# Patient Record
Sex: MALE | Race: OTHER | Employment: UNEMPLOYED | URBAN - METROPOLITAN AREA
[De-identification: names, ages, dates, MRNs, and addresses within clinical notes are randomized per-mention and may not be internally consistent; named-entity substitution may affect disease eponyms.]

---

## 2017-06-10 ENCOUNTER — HOSPITAL ENCOUNTER (EMERGENCY)
Age: 47
Discharge: HOME | End: 2017-06-10
Attending: EMERGENCY MEDICINE
Payer: SELF-PAY

## 2017-06-10 ENCOUNTER — APPOINTMENT (OUTPATIENT)
Dept: CT IMAGING | Age: 47
End: 2017-06-10
Attending: EMERGENCY MEDICINE
Payer: SELF-PAY

## 2017-06-10 VITALS
SYSTOLIC BLOOD PRESSURE: 122 MMHG | WEIGHT: 171 LBS | RESPIRATION RATE: 15 BRPM | OXYGEN SATURATION: 100 % | BODY MASS INDEX: 30.3 KG/M2 | HEIGHT: 63 IN | HEART RATE: 65 BPM | TEMPERATURE: 97.8 F | DIASTOLIC BLOOD PRESSURE: 84 MMHG

## 2017-06-10 DIAGNOSIS — M54.50 ACUTE BILATERAL LOW BACK PAIN WITHOUT SCIATICA: Primary | ICD-10-CM

## 2017-06-10 LAB
ALBUMIN SERPL BCP-MCNC: 4 G/DL (ref 3.5–5)
ALBUMIN/GLOB SERPL: 1 {RATIO} (ref 1.1–2.2)
ALP SERPL-CCNC: 90 U/L (ref 45–117)
ALT SERPL-CCNC: 28 U/L (ref 12–78)
ANION GAP BLD CALC-SCNC: 7 MMOL/L (ref 5–15)
APPEARANCE UR: CLEAR
AST SERPL W P-5'-P-CCNC: 19 U/L (ref 15–37)
BACTERIA URNS QL MICRO: NEGATIVE /HPF
BASOPHILS # BLD AUTO: 0.1 K/UL (ref 0–0.1)
BASOPHILS # BLD: 1 % (ref 0–1)
BILIRUB SERPL-MCNC: 0.3 MG/DL (ref 0.2–1)
BILIRUB UR QL: NEGATIVE
BUN SERPL-MCNC: 7 MG/DL (ref 6–20)
BUN/CREAT SERPL: 7 (ref 12–20)
CALCIUM SERPL-MCNC: 8.6 MG/DL (ref 8.5–10.1)
CHLORIDE SERPL-SCNC: 105 MMOL/L (ref 97–108)
CO2 SERPL-SCNC: 27 MMOL/L (ref 21–32)
COLOR UR: ABNORMAL
CREAT SERPL-MCNC: 0.94 MG/DL (ref 0.7–1.3)
EOSINOPHIL # BLD: 0.6 K/UL (ref 0–0.4)
EOSINOPHIL NFR BLD: 10 % (ref 0–7)
EPITH CASTS URNS QL MICRO: ABNORMAL /LPF
ERYTHROCYTE [DISTWIDTH] IN BLOOD BY AUTOMATED COUNT: 13.5 % (ref 11.5–14.5)
GLOBULIN SER CALC-MCNC: 3.9 G/DL (ref 2–4)
GLUCOSE SERPL-MCNC: 103 MG/DL (ref 65–100)
GLUCOSE UR STRIP.AUTO-MCNC: NEGATIVE MG/DL
HCT VFR BLD AUTO: 45 % (ref 36.6–50.3)
HGB BLD-MCNC: 15.5 G/DL (ref 12.1–17)
HGB UR QL STRIP: ABNORMAL
HYALINE CASTS URNS QL MICRO: ABNORMAL /LPF (ref 0–5)
KETONES UR QL STRIP.AUTO: NEGATIVE MG/DL
LEUKOCYTE ESTERASE UR QL STRIP.AUTO: NEGATIVE
LIPASE SERPL-CCNC: 155 U/L (ref 73–393)
LYMPHOCYTES # BLD AUTO: 39 % (ref 12–49)
LYMPHOCYTES # BLD: 2.3 K/UL (ref 0.8–3.5)
MCH RBC QN AUTO: 30.5 PG (ref 26–34)
MCHC RBC AUTO-ENTMCNC: 34.4 G/DL (ref 30–36.5)
MCV RBC AUTO: 88.4 FL (ref 80–99)
MONOCYTES # BLD: 0.5 K/UL (ref 0–1)
MONOCYTES NFR BLD AUTO: 9 % (ref 5–13)
NEUTS SEG # BLD: 2.4 K/UL (ref 1.8–8)
NEUTS SEG NFR BLD AUTO: 41 % (ref 32–75)
NITRITE UR QL STRIP.AUTO: NEGATIVE
PH UR STRIP: 6 [PH] (ref 5–8)
PLATELET # BLD AUTO: 255 K/UL (ref 150–400)
POTASSIUM SERPL-SCNC: 4.3 MMOL/L (ref 3.5–5.1)
PROT SERPL-MCNC: 7.9 G/DL (ref 6.4–8.2)
PROT UR STRIP-MCNC: NEGATIVE MG/DL
RBC # BLD AUTO: 5.09 M/UL (ref 4.1–5.7)
RBC #/AREA URNS HPF: ABNORMAL /HPF (ref 0–5)
SODIUM SERPL-SCNC: 139 MMOL/L (ref 136–145)
SP GR UR REFRACTOMETRY: <1.005 (ref 1–1.03)
UA: UC IF INDICATED,UAUC: ABNORMAL
UROBILINOGEN UR QL STRIP.AUTO: 0.2 EU/DL (ref 0.2–1)
WBC # BLD AUTO: 5.9 K/UL (ref 4.1–11.1)
WBC URNS QL MICRO: ABNORMAL /HPF (ref 0–4)

## 2017-06-10 PROCEDURE — 74177 CT ABD & PELVIS W/CONTRAST: CPT

## 2017-06-10 PROCEDURE — 96375 TX/PRO/DX INJ NEW DRUG ADDON: CPT

## 2017-06-10 PROCEDURE — 81001 URINALYSIS AUTO W/SCOPE: CPT | Performed by: EMERGENCY MEDICINE

## 2017-06-10 PROCEDURE — 83690 ASSAY OF LIPASE: CPT | Performed by: EMERGENCY MEDICINE

## 2017-06-10 PROCEDURE — 74011250636 HC RX REV CODE- 250/636: Performed by: EMERGENCY MEDICINE

## 2017-06-10 PROCEDURE — 96374 THER/PROPH/DIAG INJ IV PUSH: CPT

## 2017-06-10 PROCEDURE — 74011636320 HC RX REV CODE- 636/320: Performed by: EMERGENCY MEDICINE

## 2017-06-10 PROCEDURE — 99284 EMERGENCY DEPT VISIT MOD MDM: CPT

## 2017-06-10 PROCEDURE — 96361 HYDRATE IV INFUSION ADD-ON: CPT

## 2017-06-10 PROCEDURE — 80053 COMPREHEN METABOLIC PANEL: CPT | Performed by: EMERGENCY MEDICINE

## 2017-06-10 PROCEDURE — 85025 COMPLETE CBC W/AUTO DIFF WBC: CPT | Performed by: EMERGENCY MEDICINE

## 2017-06-10 PROCEDURE — 36415 COLL VENOUS BLD VENIPUNCTURE: CPT | Performed by: EMERGENCY MEDICINE

## 2017-06-10 RX ORDER — IBUPROFEN 600 MG/1
600 TABLET ORAL
Qty: 30 TAB | Refills: 0 | Status: SHIPPED | OUTPATIENT
Start: 2017-06-10

## 2017-06-10 RX ORDER — KETOROLAC TROMETHAMINE 30 MG/ML
15 INJECTION, SOLUTION INTRAMUSCULAR; INTRAVENOUS
Status: COMPLETED | OUTPATIENT
Start: 2017-06-10 | End: 2017-06-10

## 2017-06-10 RX ORDER — ONDANSETRON 2 MG/ML
4 INJECTION INTRAMUSCULAR; INTRAVENOUS
Status: COMPLETED | OUTPATIENT
Start: 2017-06-10 | End: 2017-06-10

## 2017-06-10 RX ORDER — FENTANYL CITRATE 50 UG/ML
50 INJECTION, SOLUTION INTRAMUSCULAR; INTRAVENOUS
Status: COMPLETED | OUTPATIENT
Start: 2017-06-10 | End: 2017-06-10

## 2017-06-10 RX ADMIN — ONDANSETRON 4 MG: 2 INJECTION INTRAMUSCULAR; INTRAVENOUS at 11:35

## 2017-06-10 RX ADMIN — FENTANYL CITRATE 50 MCG: 50 INJECTION, SOLUTION INTRAMUSCULAR; INTRAVENOUS at 11:35

## 2017-06-10 RX ADMIN — SODIUM CHLORIDE 1000 ML: 900 INJECTION, SOLUTION INTRAVENOUS at 11:30

## 2017-06-10 RX ADMIN — KETOROLAC TROMETHAMINE 15 MG: 30 INJECTION, SOLUTION INTRAMUSCULAR at 11:31

## 2017-06-10 RX ADMIN — IOPAMIDOL 100 ML: 755 INJECTION, SOLUTION INTRAVENOUS at 12:38

## 2017-06-10 NOTE — DISCHARGE INSTRUCTIONS
Dolor de espalda: Instrucciones de cuidado - [ Back Pain: Care Instructions ]  Instrucciones de cuidado    El dolor de espalda tiene muchas causas posibles. Con frecuencia, está relacionado con problemas en los músculos y ligamentos de la espalda. También podría estar relacionado con problemas de los nervios, discos o huesos de la espalda. Moverse, levantar algo, ponerse de pie, sentarse o dormir de [employer] incómoda pueden forzar la espalda. Algunas veces no se da cuenta de que tiene iveth lesión [employer] tarde. La artritis es otra causa común del dolor de espalda. Aunque gina vez duela mucho, el dolor de espalda por lo general mejora por sí mismo en varias semanas. [address] personas se recuperan en 12 semanas o menos. Hacer un buen tratamiento en el hogar y tener cuidado de no forzar la espalda puede ayudar a que se sienta mejor más pronto. La atención de seguimiento es iveth parte clave de dhaliwal tratamiento y seguridad. Asegúrese de hacer y acudir a todas las citas, y llame a dhaliwal médico si está teniendo problemas. También es iveth buena idea saber los resultados de los exámenes y mantener vieth lista de los medicamentos que barb. ¿Cómo puede cuidarse en el hogar? · Siéntese o acuéstese en las posiciones más cómodas y que reduzcan el dolor. Trate iveth de estas posiciones al acostarse:  ¨ Acuéstese boca arriba con las rodillas dobladas y apoyadas sobre almohadones grandes. ¨ Acuéstese en el piso con las piernas sobre el asiento de un sofá o iveth silla. ¨ Acuéstese de lado con las rodillas y caderas dobladas y [place] entre las piernas. ¨ Acuéstese boca abajo siempre que esta posición no empeore el dolor. · No se siente en la cama y evite los sofás blandos y las posiciones torcidas. El reposo en cama puede aliviar el dolor al principio, simon retrasa la sanación. Evite reposar en la cama después del primer día de dolor de espalda. · Cambie de posición cada 30 minutos.  Si debe sentarse por [employer], párese y descanse de estar sentado. Levántese y camine, o acuéstese en iveth posición cómoda. · Pruebe usar iveth almohadilla térmica a temperatura baja o mediana juany 15 a 20 minutos cada 2 ó 3 horas. Pruebe iveth ducha tibia en vez de iveth sesión con la almohadilla térmica. · También puede probar iveth compresa de hielo juany 10 a 15 minutos cada 2 a 3 horas. Póngase un paño medeiros entre la compresa de hielo y la piel. · [employer] analgésicos (medicamentos para el dolor) exactamente según las indicaciones. ¨ Si el médico le recetó un analgésico, tómelo según las indicaciones. ¨ Si no está tomando un analgésico recetado, pregúntele a dhaliwal médico si puede becki kiel de [employer]. · Umberto caminatas cortas varias veces al día. Usted puede comenzar con 5 a 10 minutos, 3 ó 4 veces al día, y aumentar progresivamente hasta lograr caminatas más largas. Camine en superficies amie y evite colinas y escaleras hasta que la espalda esté mejor. · Regrese al Jillian Stair y otras actividades lo más pronto posible. El descanso continuo sin actividad por lo general no es lau para la espalda. · Para prevenir el dolor de espalda en el futuro, umberto ejercicios para estirar y fortalecer la espalda y el abdomen. Aprenda a [employer], técnicas seguras para levantar peso y la mecánica corporal apropiada. ¿Cuándo debe pedir ayuda? Llame a dhaliwal médico ahora mismo o busque atención médica inmediata si:  · Tiene un nuevo entumecimiento en las piernas o éste empeora. · Tiene un nuevo debilitamiento en [place]. ([place] puede hacer que sea difícil ponerse de pie). · Pierde el control de la vejiga o los intestinos. Preste especial atención a los cambios en dhaliwal gio y asegúrese de comunicarse con dhaliwal médico si:  · El migdalia PEREZ. · No está mejorando después de 2 semanas. ¿Dónde puede encontrar más información en inglés? Britt Deter a [email or web address].[email or web address]   Escriba R037 en la búsqueda para aprender Fatoumata Sexton de \"Dolor de espalda: Instrucciones de cuidado - [ Back Pain: Care Instructions ]. \"  Revisado: 23 mayo, 2016  Versión del contenido: 11.2  © 7153-7898 Healthwise, Incorporated. Las instrucciones de cuidado fueron adaptadas bajo licencia por [employer] (which disclaims liability or warranty for this information). Si usted tiene [place] afección médica o sobre estas instrucciones, siempre pregunte a dhaliwal profesional de gio. Healthwise, Incorporated niega toda garantía o responsabilidad por dhaliwal uso de esta información. Vías urinarias masculinas: Dibujo anatómico - [ Male Urinary Tract: Anatomy Sketch ]    Revisado: 5 enero, 2017  Versión del contenido: 11.2  © 8210-1264 Healthwise, Incorporated. Las instrucciones de cuidado fueron adaptadas bajo licencia por [employer] (which disclaims liability or warranty for this information). Si usted tiene [place] afección médica o sobre estas instrucciones, siempre pregunte a dhaliwal profesional de gio. Healthwise, Incorporated niega toda garantía o responsabilidad por dhaliwal uso de esta información. We hope that we have addressed all of your medical concerns. The examination and treatment you received in the Emergency Department were for an emergent problem and were not intended as complete care. It is important that you follow up with your healthcare provider(s) for ongoing care. If your symptoms worsen or do not improve as expected, and you are unable to reach your usual health care provider(s), you should return to the Emergency Department. Today's healthcare is undergoing tremendous change, and patient satisfaction surveys are one of the many tools to assess the quality of medical care. You may receive a survey from the [employer] organization regarding your experience in the Emergency Department.   I hope that your experience has been completely positive, particularly the medical care that I provided. As such, please participate in the survey; anything less than excellent does not meet my expectations or intentions. [facility] and [facility] participate in nationally recognized quality of care measures. If your blood pressure is greater than 120/80, as reported below, we urge that you seek medical care to address the potential of high blood pressure, commonly known as hypertension. Hypertension can be hereditary or can be caused by certain medical conditions, pain, stress, or \"white coat syndrome. \"       Please make an appointment with your health care provider(s) for follow up of your Emergency Department visit. VITALS:   Patient Vitals for the past 8 hrs:   Temp Pulse Resp BP SpO2   06/10/17 1215 - - - 111/77 98 %   06/10/17 1200 - - - 115/54 97 %   06/10/17 1145 - - - 114/80 94 %   06/10/17 1049 97.8 °F (36.6 °C) 81 15 132/82 98 %          Thank you for allowing us to provide you with medical care today. We realize that you have many choices for your emergency care needs. Please choose us in the future for any continued health care needs. [address], [address] [facility].   Office: [phone]            Recent Results (from the past 24 hour(s))   URINALYSIS W/ REFLEX CULTURE    Collection Time: 06/10/17 11:01 AM   Result Value Ref Range    Color YELLOW/STRAW      Appearance CLEAR CLEAR      Specific gravity <1.005 1.003 - 1.030    pH (UA) 6.0 5.0 - 8.0      Protein NEGATIVE  NEG mg/dL    Glucose NEGATIVE  NEG mg/dL    Ketone NEGATIVE  NEG mg/dL    Bilirubin NEGATIVE  NEG      Blood TRACE (A) NEG      Urobilinogen 0.2 0.2 - 1.0 EU/dL    Nitrites NEGATIVE  NEG      Leukocyte Esterase NEGATIVE  NEG      WBC 0-4 0 - 4 /hpf    RBC 0-5 0 - 5 /hpf    Epithelial cells FEW FEW /lpf    Bacteria NEGATIVE  NEG /hpf    UA:UC IF INDICATED CULTURE NOT INDICATED BY UA RESULT CNI      Hyaline cast 0-2 0 - 5 /lpf   LIPASE    Collection Time: 06/10/17 11:01 AM   Result Value Ref Range    Lipase 155 73 - 806 U/L   METABOLIC PANEL, COMPREHENSIVE    Collection Time: 06/10/17 11:01 AM   Result Value Ref Range    Sodium 139 136 - 145 mmol/L    Potassium 4.3 3.5 - 5.1 mmol/L    Chloride 105 97 - 108 mmol/L    CO2 27 21 - 32 mmol/L    Anion gap 7 5 - 15 mmol/L    Glucose 103 (H) 65 - 100 mg/dL    BUN 7 6 - 20 MG/DL    Creatinine 0.94 0.70 - 1.30 MG/DL    BUN/Creatinine ratio 7 (L) 12 - 20      GFR est AA >60 >60 ml/min/1.73m2    GFR est non-AA >60 >60 ml/min/1.73m2    Calcium 8.6 8.5 - 10.1 MG/DL    Bilirubin, total 0.3 0.2 - 1.0 MG/DL    ALT (SGPT) 28 12 - 78 U/L    AST (SGOT) 19 15 - 37 U/L    Alk. phosphatase 90 45 - 117 U/L    Protein, total 7.9 6.4 - 8.2 g/dL    Albumin 4.0 3.5 - 5.0 g/dL    Globulin 3.9 2.0 - 4.0 g/dL    A-G Ratio 1.0 (L) 1.1 - 2.2     CBC WITH AUTOMATED DIFF    Collection Time: 06/10/17 11:01 AM   Result Value Ref Range    WBC 5.9 4.1 - 11.1 K/uL    RBC 5.09 4. 10 - 5.70 M/uL    HGB 15.5 12.1 - 17.0 g/dL    HCT 45.0 36.6 - 50.3 %    MCV 88.4 80.0 - 99.0 FL    MCH 30.5 26.0 - 34.0 PG    MCHC 34.4 30.0 - 36.5 g/dL    RDW 13.5 11.5 - 14.5 %    PLATELET 552 060 - 821 K/uL    NEUTROPHILS 41 32 - 75 %    LYMPHOCYTES 39 12 - 49 %    MONOCYTES 9 5 - 13 %    EOSINOPHILS 10 (H) 0 - 7 %    BASOPHILS 1 0 - 1 %    ABS. NEUTROPHILS 2.4 1.8 - 8.0 K/UL    ABS. LYMPHOCYTES 2.3 0.8 - 3.5 K/UL    ABS. MONOCYTES 0.5 0.0 - 1.0 K/UL    ABS. EOSINOPHILS 0.6 (H) 0.0 - 0.4 K/UL    ABS. BASOPHILS 0.1 0.0 - 0.1 K/UL       Ct Abd Pelv W Cont    Result Date: 6/10/2017  EXAM: CT ABDOMEN PELVIS WITH CONTRAST INDICATION:  Bilateral flank pain, UTI. COMPARISON: None. CONTRAST: 100 mL of Isovue-370. TECHNIQUE: Multislice helical CT was performed from the diaphragm to the symphysis pubis during uneventful rapid bolus intravenous contrast administration. Oral contrast was not administered.  Contiguous 5 mm axial images were reconstructed and lung and soft tissue windows were generated. Coronal and sagittal reformations were generated. CT dose reduction was achieved through use of a standardized protocol tailored for this examination and automatic exposure control for dose modulation. FINDINGS: LOWER CHEST: The visualized portions of the lung bases are clear. ABDOMEN: Liver: Small low-attenuation lesion on portal venous phase images which is isodense on delayed images, likely representing a hemangioma. No suspicious liver lesions. Gallbladder and bile ducts: No gallstones. No biliary dilatation. Spleen: No abnormality. Pancreas: No abnormality. Adrenal glands: No abnormality. Kidneys: No abnormality. No calculi or hydronephrosis. No focal lesions. PELVIS: Reproductive organs:  Mild prostatic enlargement. Bladder: No abnormality. BOWEL AND MESENTERY: Stomach appears unremarkable. No small bowel abnormalities. No significant colonic abnormalities. No mesenteric mass or adenopathy. Appendix within normal limits. PERITONEUM: No ascites or free intraperitoneal air. RETROPERITONEUM: Aorta  tapers without aneurysm. No retroperitoneal adenopathy or mass. No pelvic mass or adenopathy. BONES AND SOFT TISSUES: No significant bony abnormalities. IMPRESSION: No acute findings in the abdomen or pelvis. No significant abnormalities. Jerome Gia

## 2017-06-10 NOTE — ED TRIAGE NOTES
Bilateral lower back pain since Monday, no injury. Hx of similar pain last year with a kidney infection.

## 2017-06-10 NOTE — ED PROVIDER NOTES
HPI Comments: 51 y.o. male with no significant past medical history who presents from home with chief complaint of flank pain. Pt reports equal bilateral 8/10 flank pain for the last 5 days which is worsened positionally. He also c/o fever 2-3 days ago, but none currently. Per Pt's daughter, Pt was seen at [facility] 2 days ago, was dx with kidney stones, and was placed on hydrocodone, flomax, and bactrim. She notes the medications have not been helping. Pt denies testicular pain, dark urine, nausea, and vomiting. There are no other acute medical concerns at this time. Note written by Ania Santos, as dictated by Malachi Johnson MD 11:08 AM      The history is provided by the patient. No past medical history on file. No past surgical history on file. No family history on file. Social History     Social History    Marital status: N/A     Spouse name: N/A    Number of children: N/A    Years of education: N/A     Occupational History    Not on file. Social History Main Topics    Smoking status: Not on file    Smokeless tobacco: Not on file    Alcohol use Not on file    Drug use: Not on file    Sexual activity: Not on file     Other Topics Concern    Not on file     Social History Narrative         ALLERGIES: Review of patient's allergies indicates no known allergies. Review of Systems   Constitutional: Positive for fever. Gastrointestinal: Negative for nausea and vomiting. Genitourinary: Positive for flank pain. Negative for testicular pain. All other systems reviewed and are negative. Vitals:    06/10/17 1049   BP: 132/82   Pulse: 81   Resp: 15   Temp: 97.8 °F (36.6 °C)   SpO2: 98%   Weight: 77.6 kg (171 lb)   Height: 5' 3\" (1.6 m)            Physical Exam   Constitutional: He is oriented to person, place, and time. He appears well-developed and well-nourished. No distress. HENT:   Head: Normocephalic and atraumatic.    Mouth/Throat: Oropharynx is clear and moist. No oropharyngeal exudate. Eyes: Conjunctivae and EOM are normal. Pupils are equal, round, and reactive to light. Right eye exhibits no discharge. Left eye exhibits no discharge. Neck: Normal range of motion. Neck supple. Cardiovascular: Normal rate, regular rhythm and intact distal pulses. Exam reveals no gallop and no friction rub. No murmur heard. Pulmonary/Chest: Effort normal and breath sounds normal. No respiratory distress. He has no wheezes. He has no rales. He exhibits no tenderness. Abdominal: Soft. Bowel sounds are normal. He exhibits no distension and no mass. There is no tenderness. There is no rebound and no guarding. Musculoskeletal: He exhibits tenderness. He exhibits no edema. Lumbar back: He exhibits decreased range of motion, tenderness and pain. He exhibits no bony tenderness, no swelling, no edema, no deformity, no laceration, no spasm and normal pulse. Back:    Min ttp/ no step-offs/ no cauda equina sx/ pt has distal motor/ CV/ Sensation grossly intact bilat feet   Lymphadenopathy:     He has no cervical adenopathy. Neurological: He is alert and oriented to person, place, and time. No cranial nerve deficit. Coordination normal.   Skin: Skin is warm and dry. No rash noted. No erythema. Psychiatric: He has a normal mood and affect. Nursing note and vitals reviewed. [facility]  ED Course       Procedures    PROGRESS NOTE:  12:33 PM  Pt feels better after pain medication. Awaiting CT and lab results. 1:46 PM 'feeling better'; agrees w/ plans;   [place]  results have been reviewed with him. He has been counseled regarding his diagnosis. He verbally conveys understanding and agreement of the signs, symptoms, diagnosis, treatment and prognosis and additionally agrees to Call/ Arrange follow up as recommended in 24 - 48 hours. He also agrees with the care-plan and conveys that all of his questions have been answered.   I have also put together some discharge instructions for him that include: 1) educational information regarding their diagnosis, 2) how to care for their diagnosis at home, as well a 3) list of reasons why they would want to return to the ED prior to their follow-up appointment, should their condition change or for concerns.